# Patient Record
Sex: MALE | Race: WHITE | NOT HISPANIC OR LATINO | Employment: FULL TIME | URBAN - METROPOLITAN AREA
[De-identification: names, ages, dates, MRNs, and addresses within clinical notes are randomized per-mention and may not be internally consistent; named-entity substitution may affect disease eponyms.]

---

## 2020-08-24 PROBLEM — S62.617B OPEN DISPLACED FRACTURE OF PROXIMAL PHALANX OF LEFT LITTLE FINGER: Status: ACTIVE | Noted: 2020-08-24
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
## 2020-09-03 ENCOUNTER — CLINICAL SUPPORT (OUTPATIENT)
Dept: REHABILITATION | Facility: HOSPITAL | Age: 25
End: 2020-09-03
Payer: MEDICAID

## 2020-09-03 DIAGNOSIS — M79.645 PAIN IN FINGER OF LEFT HAND: ICD-10-CM

## 2020-09-03 DIAGNOSIS — R68.89 DECREASED FUNCTIONAL ACTIVITY TOLERANCE: ICD-10-CM

## 2020-09-03 DIAGNOSIS — S62.617B OPEN DISPLACED FRACTURE OF PROXIMAL PHALANX OF LEFT LITTLE FINGER, INITIAL ENCOUNTER: ICD-10-CM

## 2020-09-03 DIAGNOSIS — M25.642 DECREASED RANGE OF MOTION OF FINGER OF LEFT HAND: ICD-10-CM

## 2020-09-03 PROCEDURE — L3808 WHFO, RIGID W/O JOINTS: HCPCS | Mod: PN

## 2020-09-03 PROCEDURE — 97166 OT EVAL MOD COMPLEX 45 MIN: CPT | Mod: PN

## 2020-09-03 NOTE — PLAN OF CARE
" [facility] Occupational Therapy  Initial Evaluation     Date: 9/3/2020  Name: Fransisco Dominguez  MRN Number: [id number]   Medical Diagnosis: Left Small Finger open displaced fracture of proximal phalanx with ORIF with pinning  Therapy Diagnosis: Left Small Finger Pain after proximal phalanx fracture with ORIF  Physician: SAAMNTHA Dominguez MD    Physician Orders: Per Dr Shaw: "Please fabricate a thermoplastic left ulnar gutter splint",   Evaluation Date: 9/3/2020  Insurance Authorization Period Expiration: 12/31/2020  Plan of Care Certification Period to: 10/28/2020  Date of Return to MD: unknown    Visit # / Visits authorized: 1/1  # No shows/ # Cancellations:0  Time In:330pm  Time Out: 455pm  Total Billable Time: 85 minutes      CMS Impairment/Limitation/Restriction for FOTO Hand Survey    Therapist reviewed FOTO scores for Fransisco Dominguez on 9/3/2020.   FOTO documents entered into [employer] - see Media section.    Limitation Score: 47%  Goal at Discharge Score:  28%  Category: Carrying    Current : CK = at least 40% but < 60% impaired, limited or restricted  Goal: CJ = at least 20% but < 40% impaired, limited or restricted  Discharge: CJ = at least 20% but < 40% impaired, limited or restricted           Subjective     Involved Side: Left  Dominant Side: Right  Date of onset/ surgery: 8/24/2020  S/P : 1 week, 2 days post surgery on 9/3/2020  Surgical Procedure: ORIF, FINGER-SMALL FINGER-PINNING  Precautions:  fracture  Imaging: Xray 8/24/2020 FINDINGS:  Fracture of the 5th proximal phalanx with prominent apex volar angulation.  Laceration is present at the site and open fracture may be present.  No definite dislocation though there is hyperflexion at the proximal interphalangeal joint.  No radiopaque foreign body.  Impression:  Possibly open fracture of the 5th proximal phalanx with prominent apex volar angulation.    HISTORY/OCCUPATIONAL PROFILE/ Medical and Therapy History including Current " History of Injury /Mechanism of Injury:  Fransisco Dominguez is a 25 year old right hand dominant male who presents for occupational therapy today,09/02/2020 and  is1 weeks 2 days s/p Left Small Finger open displaced fracture of proximal phalanx with ORIF with pinning by Dr Shaw.  He states he was arguing with his younger brother at his apartment and the door slammed on his SF.  He went to [facility] where he was diagnosed with p1 fracture of SF and scheduled for ORIF with pinning.  He arrives today in therapy with half cast.  Orders are for a ulnar gutter orthotic, custom.    Patient's chief complaint is lack of motion and pain and reports difficulty with all ADLs  (See Functional Status Below for specific details)    Previous Management or Therapy for this Injury:  Rehabilitation Expectation/Goals: Patient's goals for therapy are to be able to   - minimize: pain  - normalize: loss of function  Pain:  Functional Pain Scale Rating 0-10:    On arrival to therapy/ at Rest:     2 out of 10  While working/ With Activity:  6 out of 10  When leaving therapy:  2 out of 10  Location of Pain:  SF mp  Description of Pain:  ache  Aggravating Factors: hitting it  Easing Factors: resting    Response to Previous Treatment: initial eval today  Functional Change: initial eval today    Previous Medical Management/ Past Medical History/Physical Systems Review:   Patient denies any previous injury to this area.    Fransisco Dominguez  has a past medical history of ADHD (attention deficit hyperactivity disorder), Anxiety, Bipolar 1 disorder, Concussion, Post traumatic stress disorder, and Substance abuse.    Fransisco Dominguez  has a past surgical history that includes Open reduction and internal fixation (ORIF) of injury of finger (Left, 8/24/2020) and Repair of laceration (Left, 8/24/2020).    Fransisco has a current medication list which includes the following prescription(s): acetaminophen, cefadroxil, and ibuprofen.    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  Working presently: no     FUNCTIONAL STATUS:   Previous functional status includes: Independent with all ADLs and ambulating without assistance   Current FunctionalStatus: Unable to use right for ANY ADLs due to ORIF   Home/Living environment :  Lives with adopted family and 2 brothers 12 and 13     Environmental Concerns/ Fall Risk:  None   Barriers to Learning:  None   Cultural/Spiritual : None   Developmental/Education: None  Nutritional Deficit: None   Abuse/Neglect : None    Language: None   Hearing/Vision Deficit: None   Other:       Objective       Observation/Appearance:  2 intact pins in dorsal mp/p1 Left small finger  Sensation Test:   Sensation grossly intact to light touch all dermatomal regions  Stereognosis:  Not tested  [place] Testing:  Not tested at this time  Sensitivity:  Patient denies numbness & tingling; Temp, touch and pressure sense are intact in fingertips per patient  Description: no signs and symptoms of infection    Range of Motion: AROM: not tested due to surgery; orders for custom orthotic    Coordination:  severely impaired for FMC in left  in ADL/IADL's   Endurance: severely impaired for light ADL/IADL's w/ use of left     Strength: (ADRIAN Dynamometer in lbs.), Average 3 trials, Position II: deferred    Treatment   An Occupational Therapy evaluation was performed today and instructions in a written HEP including edema control instructions and orthotic instructions    THERAPEUTIC ACTIVITIES x 15 mins to increase ROM, functional use, coordination, dexterity and improve fine motor control  -instructions in size E Ryan sleeve and orthotic instructions precautions  -AROM thumb, IF    Total Treatment time separate from Evaluation time: 15  Patient did require cues to don orthotic and ryan sleeve correctly.    Patient has a referral for splint fabrication from 's office  today. Patient was referred for a Ulnar Gutter Orthosis to Left hand, . Patient is noted to have mild edema in LRF and LSF of L UE. A right custom ulnar gutter orthosis,  was fabricated for support/stabilization with ryan sleeve included . Excellent fit was achieved upon completion of splint. Patient was provided instruction on orthosis purpose, wear schedule, care and precautions to monitor for orthosis. Patient was also fitted for a ryan sleeve to help with edema throughout left hand.       Home Exercise Program/Education (HEP):  NO HEP due to surgical precautions    Patient/Family Education: discussed the role of OT, goals for OT, scheduling/cancellations.  They verbalized understanding of this. Discussed insurance limitations with patient/family.    HEP update 9/3/2020 : documented new   HEP in Patient Instructions or Media and/or scanned: Yes, scanned      Assessment     Medical necessity is demonstrated by the following IMPAIRMENTS/PROBLEMS:  Patient Lack of Knowledge/Awareness in Home Program  Increased Pain in Left hand  Decreased functional use/ unable to perform ADLs/iADLs  Increased Edema  Decreased ROM   Decreased  strength  Decreased pinch strength    Fransisco Dominguez is a 25 y.o. male referred to outpatient occupational therapy and presents with a medical diagnosis of Left Small Finger open displaced fracture of proximal phalanx with ORIF with pinning, resulting in Decreased ROM, Decreased  strength, Decreased pinch strength, Decreased functional hand use, Increased pain, Edema, Joint Stiffness, Scar Adhesions and Diminished/Impaired Coordination and demonstrates limitations as described in the above problem list. He presents today with increased pain and edema and has on left with decreased ROM & decreased functioning. Splint precautions and instructions were reviewed and patient was able to independently don and doff the orthotic properly while in the clinic.  Patient will follow up in  one week for adjustments or sooner if needed.  Patient would benefit from skilled OT to follow-up on orthotic modification/adjustment, edema management and ROM.   Following medical record review it is determined that Fransisco Dominguez will benefit from skilled occupational therapy services in order to maximize pain free and/or functional use of left hand and to maximize their level of independence in the home and community environment. The following goals were discussed with the patient and patient is in agreement with them as to be addressed in the treatment plan. The patient's rehab potential is Good.  The patient and or family/caregiver was educated regarding the details of their diagnosis, prognosis, related pathology, plan of care and modality use.  The patient demonstrates a good understanding of the risks, benefits, precautions/ contraindications and prognosis of their skilled occupational therapy rehabilitation program.  We reviewed therapy expectations and goals and it was understood clearly that they will be active participants in their rehabilitation.    Fransisco demonstrated a good understanding of the education provided including HEP and modalities for pain management.     Patient prefers to attend therapy:  1 times a week with time preference afternoon/evening  Anticipated barriers to occupational therapy:  None  Fransisco has no cultural, educational or language barriers to the learning provided.    Profile and History Assessment of Occupational Performance Level of Clinical Decision Making Complexity Score   Occupational Profile:   Fransisco Dominguez is a 25 y.o. male who lives with their family and is currently unemployed. Fransisco Dominguez has difficulty with  feeding, bathing, grooming and dressing  driving/transportation management, shopping, phone/computer use and housework/household chores  affecting his/her daily functional abilities. 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     Comorbidities:   anxiety, coping style/mechanism and depression    Medical and Therapy History Review:   Expanded               Performance Deficits    Physical:  No Deficits  Joint Stability  Muscle Power/Strength  Muscle Endurance  Skin Integrity/Scar Formation  Edema   Strength  Pinch Strength  Fine Motor Coordination  Proprioception Functions  Pain    Cognitive:  Attention    Psychosocial:    Social Interaction  Habits  Routines     Clinical Decision Making:  moderate    Assessment Process:  Problem-Focused Assessments    Modification/Need for Assistance:  Minimal-Moderate Modifications/Assistance    Intervention Selection:  Several Treatment Options       moderate  Based on PMHX, co morbidities , data from assessments and functional level of assistance required with task and clinical presentation directly impacting function.       GOAL Achievement Assessed or Modified 9/3/2020: new goals below  GOALS:  1) Patient will be independent in donning and doffing splint with precautions  2) Patient will have a proper fitting orthosis/splint with correct positioning, and we will make adjustments as needed to accommodate for reduction in edema  3)  Patient will show overall improved functional ability with upper extremity by achieving a FOTO (Focus on Therapeutic Outcomes) score of less than or equal to 28%     Pt's spiritual, cultural and educational needs considered and patient is agreeable to plan of care and goals as stated above.     There are Anticipated Barriers for Occupational Therapy: transportation       Plan   Certification Period/Plan of care expiration: 9/3/2020 to 10/28/2020.    Outpatient Occupational Therapy 1 times weekly for 8 weeks to achieve the established goals.  Therapy for Fransisco may include the following interventions: Modalities for pain management, Therapeutic exercises/activities., Orthotic Fabrication/Fit/Training, Edema Control, Scar Management and Wound Care and may  include any other treatments deemed necessary based on the patient's needs or progress.               Patient may be discharged prior to POC expiration date if all goals/desired outcome met or if maximum rehabilitation potential has been achieved.     Updates Next Visit: To review HEP understanding and compliance and progress with OT treatment as tolerated.    Nikhil Cerna MS, OTR, CEAS, CHT

## 2020-09-17 ENCOUNTER — CLINICAL SUPPORT (OUTPATIENT)
Dept: REHABILITATION | Facility: HOSPITAL | Age: 25
End: 2020-09-17
Payer: MEDICAID

## 2020-09-17 DIAGNOSIS — R68.89 DECREASED FUNCTIONAL ACTIVITY TOLERANCE: ICD-10-CM

## 2020-09-17 DIAGNOSIS — M25.642 DECREASED RANGE OF MOTION OF FINGER OF LEFT HAND: ICD-10-CM

## 2020-09-17 DIAGNOSIS — M79.645 PAIN IN FINGER OF LEFT HAND: ICD-10-CM

## 2020-09-17 PROCEDURE — 97530 THERAPEUTIC ACTIVITIES: CPT | Mod: 59,PN

## 2020-09-17 NOTE — PROGRESS NOTES
"  [facility] Occupational Therapy  Daily Note     Date: 9/3/2020  Name: Fransisco Dominguez  MRN Number: [id number]   Medical Diagnosis: Left Small Finger open displaced fracture of proximal phalanx with ORIF with pinning  Therapy Diagnosis: Left Small Finger Pain after proximal phalanx fracture with ORIF  Physician: SAMANTHA Dominguez MD    Physician Orders: Per Dr Shaw: "Please fabricate a thermoplastic left ulnar gutter splint",   Evaluation Date: 9/3/2020  Insurance Authorization Period Expiration: 12/31/2020  Plan of Care Certification Period to: 10/28/2020  Date of Return to MD: unknown    Visit # / Visits authorized: 2/4  # No shows/ # Cancellations:0  Time In: 245pm  Time Out: 330pm  Total Billable Time: 45 minutes      CMS Impairment/Limitation/Restriction for FOTO Hand Survey    Therapist reviewed FOTO scores for Fransisco Dominguez on 9/3/2020.   FOTO documents entered into [employer] - see Media section.    Limitation Score: 47%  Goal at Discharge Score:  28%  Category: Carrying    Current : CK = at least 40% but < 60% impaired, limited or restricted  Goal: CJ = at least 20% but < 40% impaired, limited or restricted  Discharge: CJ = at least 20% but < 40% impaired, limited or restricted           Subjective     Involved Side: Left  Dominant Side: Right  Date of onset/ surgery: 8/24/2020  S/P :  3 week, 3 days post surgery on 9/17/2020  Surgical Procedure: ORIF, FINGER-SMALL FINGER-PINNING  Precautions:  fracture  Imaging: Xray 8/24/2020 FINDINGS:  Fracture of the 5th proximal phalanx with prominent apex volar angulation.  Laceration is present at the site and open fracture may be present.  No definite dislocation though there is hyperflexion at the proximal interphalangeal joint.  No radiopaque foreign body.  Impression:  Possibly open fracture of the 5th proximal phalanx with prominent apex volar angulation.    HISTORY/OCCUPATIONAL PROFILE/ Medical and Therapy History including Current " "History of Injury /Mechanism of Injury:  Fransisco Dominguez is a 25 year old right hand dominant male who presents for occupational therapy today,09/02/2020 and  Is s/p Left Small Finger open displaced fracture of proximal phalanx with ORIF with pinning by Dr Shaw.  He states he was arguing with his younger brother at his apartment and the door slammed on his SF.  He went to [facility] where he was diagnosed with p1 fracture of SF and scheduled for ORIF with pinning.  He arrives today in therapy with half cast.  Orders are for a ulnar gutter orthotic, custom.    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"  Pain:  Functional Pain Scale Rating 0-10:    On arrival to therapy/ at Rest:     2 out of 10  While working/ With Activity:  6 out of 10  When leaving therapy:  2 out of 10  Location of Pain:  SF mp  Description of Pain:  ache  Aggravating Factors: hitting it  Easing Factors: resting    Response to Previous Treatment: good compliant with orthotic but may be over using his hand at work  Functional Change: caitlin Dominguez  has a past medical history of ADHD (attention deficit hyperactivity disorder), Anxiety, Bipolar 1 disorder, Concussion, Post traumatic stress disorder, and Substance abuse.    Fransisco Dominguez  has a past surgical history that includes Open reduction and internal fixation (ORIF) of injury of finger (Left, 8/24/2020) and Repair of laceration (Left, 8/24/2020).    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  Working presently: no     FUNCTIONAL STATUS:   Previous functional status includes: Independent with all ADLs and ambulating without assistance   Current FunctionalStatus: Unable to use right for ANY ADLs due to ORIF   Home/Living environment :  Lives with adopted family and 2 " brothers 12 and 13     Environmental Concerns/ Fall Risk:  None   Barriers to Learning:  None   Cultural/Spiritual : None   Developmental/Education: None  Nutritional Deficit: None   Abuse/Neglect : None    Language: None   Hearing/Vision Deficit: None   Other:     Objective     Observation/Appearance:  2 intact pins in dorsal mp/p1 Left small finger  Sensation Test:   Sensation grossly intact to light touch all dermatomal regions  Sensitivity:  Patient denies numbness & tingling; Temp, touch and pressure sense are intact in fingertips per patient  Description: no signs and symptoms of infection at pin sites today 9/17/2020    Treatment     THERAPEUTIC ACTIVITIES x 45 mins to increase ROM, decrease edema through function, increase functional use, coordination, dexterity and improve fine motor control  -added 2 additional E Mariaa sleeve and orthotic instructions precautions  -AROM thumb e/f, ab, ad 2 x 10  -IF-MF e/f 3 x 10  -9 hole peg opposition to IF and MF place and remove  -padded orthotic and widened for pins and ulnar styloid     HEP update 9/17/2020 :  continue with orthotic, allowed to use thumb, IF and MF for light ADLs  HEP in Patient Instructions or Media and/or scanned: Yes, scanned    Assessment     Medical necessity is demonstrated by the following IMPAIRMENTS/PROBLEMS:  Patient Lack of Knowledge/Awareness in Home Program  Increased Pain in Left hand  Decreased functional use/ unable to perform ADLs/iADLs  Increased Edema  Decreased ROM   Decreased  strength  Decreased pinch strength    Fransisco Dominguez is a 25 y.o. male referred to outpatient occupational therapy and presents with a medical diagnosis of Left Small Finger open displaced fracture of proximal phalanx with ORIF with pinning, resulting in Decreased ROM, Decreased  strength, Decreased pinch strength, Decreased functional hand use, Increased pain, Edema, Joint Stiffness, Scar Adhesions and Diminished/Impaired Coordination and demonstrates  "limitations as described in the above problem list.  Fransisco has been compliant with custom Ulnar Gutter Orthosis to Left hand, . He thinks he may be "overdoing it at work" though because he is working as a  in the kitchen.  I urged him to adhere to precautions and we discussed bone healing and the need to keep fingers stable in the orthotic.  I also instructed him to speak with Dr Dominguez about his job duties.  He understands he should wear custom orthotic at all times in order to protect healing bone.  Edema is decreasing overall.  ROM is improving in thumb, IF and MF.   I made adjustments to orthotic for comfort. Excellent fit was achieved upon completion of orthotic. Patient was provided instruction on orthosis purpose, wear schedule, care and precautions to monitor for orthosis. Patient was also fitted with 2 extra ryan sleeves to help with edema throughout left hand. Following medical record review it is determined that Fransisco Dominguez will benefit from skilled occupational therapy services in order to maximize pain free and/or functional use of left hand and to maximize their level of independence in the home and community environment. The following goals were discussed with the patient and patient is in agreement with them as to be addressed in the treatment plan. The patient's rehab potential is Good.Fransisco demonstrated a good understanding of the education provided including HEP and modalities for pain management.     Anticipated barriers to occupational therapy:  None  Fransisco has no cultural, educational or language barriers to the learning provided.    GOAL Achievement Assessed or Modified : MET STG 1   GOALS:  1) Patient will be independent in donning and doffing splint with precautions MET 9/17/2020  2) Patient will have a proper fitting orthosis/splint with correct positioning, and we will make adjustments as needed to accommodate for reduction in edema  3)  Patient will show overall improved " functional ability with upper extremity by achieving a FOTO (Focus on Therapeutic Outcomes) score of less than or equal to 28%     Pt's spiritual, cultural and educational needs considered and patient is agreeable to plan of care and goals as stated above.     There are Anticipated Barriers for Occupational Therapy: transportation       Plan   Certification Period/Plan of care expiration: 9/3/2020 to 10/28/2020.    Outpatient Occupational Therapy 1 times weekly for 8 weeks to achieve the established goals.  Therapy for Fransisco may include the following interventions: Modalities for pain management, Therapeutic exercises/activities., Orthotic Fabrication/Fit/Training, Edema Control, Scar Management and Wound Care and may include any other treatments deemed necessary based on the patient's needs or progress.               Patient may be discharged prior to POC expiration date if all goals/desired outcome met or if maximum rehabilitation potential has been achieved.     Updates Next Visit:  Adjust orthotic as needed, assess edema and review understanding and compliance and progress with OT treatment as tolerated.    Nikhil Cerna, MS, OTR, CEAS, CHT

## 2020-10-01 ENCOUNTER — CLINICAL SUPPORT (OUTPATIENT)
Dept: REHABILITATION | Facility: HOSPITAL | Age: 25
End: 2020-10-01
Payer: MEDICAID

## 2020-10-01 DIAGNOSIS — R68.89 DECREASED FUNCTIONAL ACTIVITY TOLERANCE: ICD-10-CM

## 2020-10-01 DIAGNOSIS — M25.642 DECREASED RANGE OF MOTION OF FINGER OF LEFT HAND: ICD-10-CM

## 2020-10-01 DIAGNOSIS — M79.645 PAIN IN FINGER OF LEFT HAND: Primary | ICD-10-CM

## 2020-10-01 PROCEDURE — 97530 THERAPEUTIC ACTIVITIES: CPT | Mod: 59,PN

## 2020-10-01 NOTE — PROGRESS NOTES
"  [facility] Occupational Therapy  Daily Note     Date: 9/3/2020  Name: Fransisco Dominguez  MRN Number: [id number]   Medical Diagnosis: Left Small Finger open displaced fracture of proximal phalanx with ORIF with pinning  Therapy Diagnosis: Left Small Finger Pain after proximal phalanx fracture with ORIF  Physician: SAMANTHA Dominguez MD    Physician Orders: Per Dr Shaw: "Please fabricate a thermoplastic left ulnar gutter splint",   Evaluation Date: 9/3/2020  Insurance Authorization Period Expiration: 12/31/2020  Plan of Care Certification Period to: 10/28/2020  Date of Return to MD: unknown    Visit # / Visits authorized: 3/4  # No shows/ # Cancellations:0  Time In: 3345pm  Time Out:415pm  Total Billable Time: 30 minutes      CMS Impairment/Limitation/Restriction for FOTO Hand Survey    Therapist reviewed FOTO scores for Fransisco Dominguez on 9/3/2020.   FOTO documents entered into [facility] - see Media section.    Limitation Score: 47%  Goal at Discharge Score:  28%  Category: Carrying    Current : CK = at least 40% but < 60% impaired, limited or restricted  Goal: CJ = at least 20% but < 40% impaired, limited or restricted  Discharge: CJ = at least 20% but < 40% impaired, limited or restricted           Subjective     Involved Side: Left  Dominant Side: Right  Date of onset/ surgery: 8/24/2020  S/P :  5 week, 3 days post surgery on 10/1/2020  Surgical Procedure: ORIF, FINGER-SMALL FINGER-PINNING  Precautions:  fracture  Imaging: Xray 8/24/2020 FINDINGS:  Fracture of the 5th proximal phalanx with prominent apex volar angulation.  Laceration is present at the site and open fracture may be present.  No definite dislocation though there is hyperflexion at the proximal interphalangeal joint.  No radiopaque foreign body.  Impression:  Possibly open fracture of the 5th proximal phalanx with prominent apex volar angulation.    HISTORY/OCCUPATIONAL PROFILE/ Medical and Therapy History including Current " "History of Injury /Mechanism of Injury:  Fransisco Dominguez is a 25 year old right hand dominant male who presents for occupational therapy today,09/02/2020 and  Is s/p Left Small Finger open displaced fracture of proximal phalanx with ORIF with pinning by Dr Shaw.  He states he was arguing with his younger brother at his apartment and the door slammed on his SF.  He went to [facility] where he was diagnosed with p1 fracture of SF and scheduled for ORIF with pinning.  He arrives today in therapy with half cast.  Orders are for a ulnar gutter orthotic, custom.    Patient's chief complaint is lack of motion and pain and reports difficulty with all ADLs  (See Functional Status Below for specific details)    Daily Comments: "Dr Donahue said the pins moved and he had to realign them yesterday"  Pain:  Functional Pain Scale Rating 0-10:    On arrival to therapy/ at Rest:     2 out of 10  While working/ With Activity:  6 out of 10  When leaving therapy:  2 out of 10  Location of Pain:  SF mp  Description of Pain:  ache  Aggravating Factors: hitting it  Easing Factors: resting    Response to Previous Treatment:  States he was moving his hand too much at work and now he is only allowed to work one handed  Functional Change: none    Fransisco Dominguez  has a past medical history of ADHD (attention deficit hyperactivity disorder), Anxiety, Bipolar 1 disorder, Concussion, Post traumatic stress disorder, and Substance abuse.    Fransisco Dominguez  has a past surgical history that includes Open reduction and internal fixation (ORIF) of injury of finger (Left, 8/24/2020) and Repair of laceration (Left, 8/24/2020).    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  Working presently: no     FUNCTIONAL STATUS:   Previous functional status includes: Independent with all ADLs and ambulating without assistance   Current FunctionalStatus: Unable to use right for ANY ADLs due to ORIF   Home/Living environment :  Lives with adopted family " and 2 brothers 12 and 13     Environmental Concerns/ Fall Risk:  None   Barriers to Learning:  None   Cultural/Spiritual : None   Developmental/Education: None  Nutritional Deficit: None   Abuse/Neglect : None    Language: None   Hearing/Vision Deficit: None   Other:     Objective     Observation/Appearance:  2 intact pins in dorsal mp/p1 Left small finger  Sensation Test:   Sensation grossly intact to light touch all dermatomal regions  Sensitivity:  Patient denies numbness & tingling; Temp, touch and pressure sense are intact in fingertips per patient  Description: redness around SF pin with dried clear drainage - contacted Dr Donahue's office and left vm    Treatment     THERAPEUTIC ACTIVITIES x 45 mins to increase ROM, decrease edema through function, increase functional use, coordination, dexterity and improve fine motor control  NP-added 2 additional E Mariaa sleeve and orthotic instructions precautions  -added 2 black non compressive sleeves to his program  -AROM thumb e/f, ab, ad 2 x 10  -IF-MF e/f 3 x 10  -9 hole peg opposition to IF and MF place and remove  NP-padded orthotic and widened for pins and ulnar styloid  -cold pack with instructions  - Light neosporin with instructions     HEP update 10/1/2020 :  continue with orthotic, allowed to use thumb, IF and MF for light ADLs  HEP in Patient Instructions or Media and/or scanned: Yes, scanned    Assessment     Medical necessity is demonstrated by the following IMPAIRMENTS/PROBLEMS:  Patient Lack of Knowledge/Awareness in Home Program  Increased Pain in Left hand  Decreased functional use/ unable to perform ADLs/iADLs  Increased Edema  Decreased ROM   Decreased  strength  Decreased pinch strength    Fransisco Dominguez is a 25 y.o. male referred to outpatient occupational therapy and presents with a medical diagnosis of Left Small Finger open displaced fracture of proximal phalanx with ORIF with pinning, resulting in Decreased ROM, Decreased  strength,  Decreased pinch strength, Decreased functional hand use, Increased pain, Edema, Joint Stiffness, Scar Adhesions and Diminished/Impaired Coordination and demonstrates limitations as described in the above problem list.  Fransisco has been compliant with custom Ulnar Gutter Orthosis to Left hand, . He saw Dr Dominguez yesterday and states his pins had moved from using it too much and he realigned the pins.  He arrives in a sling now and is only allowed to work one handed.  He has some redness (pic taken on my phone) and dried clear drainage.  I contacted Dr Dominguez's office regarding this and left my number.  Fransisco understands he should wear custom orthotic at all times in order to protect healing bone.  He was issued 2 non compressive black sleeves to wear under splint. Light neosporin with instructions. Following medical record review it is determined that Fransisco Dominguez will benefit from skilled occupational therapy services in order to maximize pain free and/or functional use of left hand and to maximize their level of independence in the home and community environment. The following goals were discussed with the patient and patient is in agreement with them as to be addressed in the treatment plan. The patient's rehab potential is Good.Fransisco demonstrated a good understanding of the education provided including HEP and modalities for pain management.     Anticipated barriers to occupational therapy:  None  Fransisco has no cultural, educational or language barriers to the learning provided.    GOAL Achievement Assessed or Modified : MET STG 1   GOALS:  1) Patient will be independent in donning and doffing splint with precautions MET 9/17/2020  2) Patient will have a proper fitting orthosis/splint with correct positioning, and we will make adjustments as needed to accommodate for reduction in edema  3)  Patient will show overall improved functional ability with upper extremity by achieving a FOTO (Focus on Therapeutic Outcomes)  score of less than or equal to 28%     Pt's spiritual, cultural and educational needs considered and patient is agreeable to plan of care and goals as stated above.     There are Anticipated Barriers for Occupational Therapy: transportation       Plan   Certification Period/Plan of care expiration: 9/3/2020 to 10/28/2020.    Outpatient Occupational Therapy 1 times weekly for 8 weeks to achieve the established goals.  Therapy for Fransisco may include the following interventions: Modalities for pain management, Therapeutic exercises/activities., Orthotic Fabrication/Fit/Training, Edema Control, Scar Management and Wound Care and may include any other treatments deemed necessary based on the patient's needs or progress.               Patient may be discharged prior to POC expiration date if all goals/desired outcome met or if maximum rehabilitation potential has been achieved.     Updates Next Visit: Await Dr Dominguez's return phone call; sent Fransisco my phone number and to text or call the MD if it becomes more red, hot or painful.    Nikhil Cerna, MS, OTR, CEAS, CHT

## 2020-10-15 ENCOUNTER — CLINICAL SUPPORT (OUTPATIENT)
Dept: REHABILITATION | Facility: HOSPITAL | Age: 25
End: 2020-10-15
Payer: MEDICAID

## 2020-10-15 DIAGNOSIS — M25.642 DECREASED RANGE OF MOTION OF FINGER OF LEFT HAND: ICD-10-CM

## 2020-10-15 DIAGNOSIS — R68.89 DECREASED FUNCTIONAL ACTIVITY TOLERANCE: ICD-10-CM

## 2020-10-15 DIAGNOSIS — M79.645 PAIN IN FINGER OF LEFT HAND: Primary | ICD-10-CM

## 2020-10-15 PROCEDURE — 97530 THERAPEUTIC ACTIVITIES: CPT | Mod: PN

## 2020-10-15 NOTE — PROGRESS NOTES
"  [facility] Occupational Therapy  Daily Note     Date: 9/3/2020  Name: Fransisco Dominguez  MRN Number: [id number]   Medical Diagnosis: Left Small Finger open displaced fracture of proximal phalanx with ORIF with pinning  Therapy Diagnosis: Left Small Finger Pain after proximal phalanx fracture with ORIF  Physician: SAMANTHA Dominguez MD    Physician Orders: Per Dr Shaw: "Please fabricate a thermoplastic left ulnar gutter splint",   Evaluation Date: 9/3/2020  Insurance Authorization Period Expiration: 12/31/2020  Plan of Care Certification Period to: 10/28/2020  Date of Return to MD: unknown    Visit # / Visits authorized: 4/4  # No shows/ # Cancellations:0  Time In: 1000am  Time Out:1015am  Total Billable Time: 15 minutes      CMS Impairment/Limitation/Restriction for FOTO Hand Survey    Therapist reviewed FOTO scores for Fransisco Dominguez on 9/3/2020.   FOTO documents entered into [employer] - see Media section.    Limitation Score: 47%  Goal at Discharge Score:  28%  Category: Carrying    Current : CK = at least 40% but < 60% impaired, limited or restricted  Goal: CJ = at least 20% but < 40% impaired, limited or restricted  Discharge: CJ = at least 20% but < 40% impaired, limited or restricted           Subjective     Involved Side: Left  Dominant Side: Right  Date of onset/ surgery: 8/24/2020  S/P :  7 week, 3 days post surgery on 10/14/2020  Surgical Procedure: ORIF, FINGER-SMALL FINGER-PINNING  Precautions:  fracture  Imaging: Xray 10/12/20 FINDINGS:  Diagnostic Results:  X-ray - Hardware intact, proximal phalanx fracture stable with callous in place    HISTORY/OCCUPATIONAL PROFILE/ Medical and Therapy History including Current History of Injury /Mechanism of Injury:  Fransisco Dominguez is a 25 year old right hand dominant male who presents for occupational therapy today,09/02/2020 and  Is s/p Left Small Finger open displaced fracture of proximal phalanx with ORIF with pinning by Dr Shaw.  He states " "he was arguing with his younger brother at his apartment and the door slammed on his SF.  He went to [facility] where he was diagnosed with p1 fracture of SF and scheduled for ORIF with pinning.  He arrives today in therapy with half cast.  Orders are for a ulnar gutter orthotic, custom.    Patient's chief complaint is lack of motion and pain and reports difficulty with all ADLs  (See Functional Status Below for specific details)    Daily Comments: "I saw Dr Donahue a few days ago, he took out the pins and told me to stay in the splint for 3 more weeks"  Pain:  Functional Pain Scale Rating 0-10:    On arrival to therapy/ at Rest:     2 out of 10  While working/ With Activity:  6 out of 10  When leaving therapy:  2 out of 10  Location of Pain:  SF mp  Description of Pain:  ache  Aggravating Factors: hitting it  Easing Factors: resting    Response to Previous Treatment:  Good, decrease in edema  Functional Change: none    Fransisco Dominguez  has a past medical history of ADHD (attention deficit hyperactivity disorder), Anxiety, Bipolar 1 disorder, Concussion, Post traumatic stress disorder, and Substance abuse.    Fransisco Dominguez  has a past surgical history that includes Open reduction and internal fixation (ORIF) of injury of finger (Left, 8/24/2020) and Repair of laceration (Left, 8/24/2020).    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  Working presently: no     FUNCTIONAL STATUS:   Previous functional status includes: Independent with all ADLs and ambulating without assistance   Current FunctionalStatus: Unable to use right for ANY ADLs due to ORIF   Home/Living environment :  Lives with adopted family and 2 brothers 12 and 13     Environmental Concerns/ Fall Risk:  None   Barriers to Learning:  None   Cultural/Spiritual : None   Developmental/Education: None  Nutritional Deficit: None   Abuse/Neglect : None    Language: None   Hearing/Vision Deficit: None   Other:     Objective       Treatment "     THERAPEUTIC ACTIVITIES x 45 mins to increase ROM, decrease edema through function, increase functional use, coordination, dexterity and improve fine motor control  -added 3 additional E Mariaa sleeve and orthotic instructions precautions  -added 1 black non compressive sleeves to his program  -AROM thumb e/f, ab, ad 2 x 10  -IF-MF e/f 3 x 10  -PROM to IF, MF and thumb  -9 hole peg opposition to IF and MF place and remove  NP-padded orthotic and widened for pins and ulnar styloid  -cold pack with instructions  - Light neosporin with instructions  -instructions in self PROM to IF and MF to include dip     HEP update 10/14/2020 :  continue with orthotic, allowed to use thumb, IF and MF for light ADLs  HEP in Patient Instructions or Media and/or scanned: Yes, scanned    Assessment     Medical necessity is demonstrated by the following IMPAIRMENTS/PROBLEMS:  Patient Lack of Knowledge/Awareness in Home Program  Increased Pain in Left hand  Decreased functional use/ unable to perform ADLs/iADLs  Increased Edema  Decreased ROM   Decreased  strength  Decreased pinch strength    Fransisco Dominguez is a 25 y.o. male referred to outpatient occupational therapy and presents with a medical diagnosis of Left Small Finger open displaced fracture of proximal phalanx with ORIF with pinning, resulting in Decreased ROM, Decreased  strength, Decreased pinch strength, Decreased functional hand use, Increased pain, Edema, Joint Stiffness, Scar Adhesions and Diminished/Impaired Coordination and demonstrates limitations as described in the above problem list.  Fransisco saw Dr Doimnguez this week.  His pins were removed and he was instructed to continue with the orthotic.  We adjusted this today.  Pin sites look pink and healing well, no signs of infection.  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  AROM of thumb, IF and MF improvements after  therapy today.  I instructed him on how to perform these on his own.  He was issued 2 non compressive black sleeves to wear under splint. Light neosporin with instructions. Following medical record review it is determined that Fransisco Dominguze will benefit from skilled occupational therapy services in order to maximize pain free and/or functional use of left hand and to maximize their level of independence in the home and community environment. The following goals were discussed with the patient and patient is in agreement with them as to be addressed in the treatment plan. The patient's rehab potential is Good.Fransisco demonstrated a good understanding of the education provided including HEP and modalities for pain management.     Anticipated barriers to occupational therapy:  None  Fransisco has no cultural, educational or language barriers to the learning provided.    GOAL Achievement Assessed or Modified : MET STG 1   GOALS:  1) Patient will be independent in donning and doffing splint with precautions MET 9/17/2020  2) Patient will have a proper fitting orthosis/splint with correct positioning, and we will make adjustments as needed to accommodate for reduction in edema  3)  Patient will show overall improved functional ability with upper extremity by achieving a FOTO (Focus on Therapeutic Outcomes) score of less than or equal to 28%     Pt's spiritual, cultural and educational needs considered and patient is agreeable to plan of care and goals as stated above.     There are Anticipated Barriers for Occupational Therapy: transportation       Plan   Certification Period/Plan of care expiration: 9/3/2020 to 10/28/2020.    Outpatient Occupational Therapy 1 times weekly for 8 weeks to achieve the established goals.  Therapy for Fransisco may include the following interventions: Modalities for pain management, Therapeutic exercises/activities., Orthotic Fabrication/Fit/Training, Edema Control, Scar Management and Wound Care and may  include any other treatments deemed necessary based on the patient's needs or progress.               Patient may be discharged prior to POC expiration date if all goals/desired outcome met or if maximum rehabilitation potential has been achieved.     Updates Next Visit: Await Dr Dominguez's return phone call; sent Fransisco my phone number and to text or call the MD if it becomes more red, hot or painful.    Nikhil Cerna, MS, OTR, CEAS, CHT

## 2020-10-26 ENCOUNTER — CLINICAL SUPPORT (OUTPATIENT)
Dept: REHABILITATION | Facility: HOSPITAL | Age: 25
End: 2020-10-26
Payer: MEDICAID

## 2020-10-26 DIAGNOSIS — M79.645 PAIN IN FINGER OF LEFT HAND: ICD-10-CM

## 2020-10-26 DIAGNOSIS — M25.642 DECREASED RANGE OF MOTION OF FINGER OF LEFT HAND: ICD-10-CM

## 2020-10-26 DIAGNOSIS — R68.89 DECREASED FUNCTIONAL ACTIVITY TOLERANCE: ICD-10-CM

## 2020-10-26 PROCEDURE — 97530 THERAPEUTIC ACTIVITIES: CPT | Mod: 59,PN

## 2020-10-26 NOTE — PROGRESS NOTES
"  [facility] Occupational Therapy  Daily Note     Date: 9/3/2020  Name: Fransisco Dominguez  MRN Number: [id number]   Medical Diagnosis: Left Small Finger open displaced fracture of proximal phalanx with ORIF with pinning  Therapy Diagnosis: Left Small Finger Pain after proximal phalanx fracture with ORIF  Physician: SAMANTHA Dominguez MD    Physician Orders: Per Dr Shaw: "Please fabricate a thermoplastic left ulnar gutter splint",   Evaluation Date: 9/3/2020  Insurance Authorization Period Expiration: 12/31/2020  Plan of Care Certification Period to: 10/28/2020  Date of Return to MD: unknown    Visit # / Visits authorized: 5/12  # No shows/ # Cancellations:0  Time In: 145pm  Time Out: 230pm  Total Billable Time: 45 minutes      CMS Impairment/Limitation/Restriction for FOTO Hand Survey    Therapist reviewed FOTO scores for Fransisco Dominguez on 9/3/2020.   FOTO documents entered into [employer] - see Media section.    Limitation Score: 47%  Goal at Discharge Score:  28%  Category: Carrying    Current : CK = at least 40% but < 60% impaired, limited or restricted  Goal: CJ = at least 20% but < 40% impaired, limited or restricted  Discharge: CJ = at least 20% but < 40% impaired, limited or restricted           Subjective     Involved Side: Left  Dominant Side: Right  Date of onset/ surgery: 8/24/2020  S/P :  9 week, 1 days post surgery on 10/26/2020  Surgical Procedure: ORIF, FINGER-SMALL FINGER-PINNING  Precautions:  fracture  Imaging: Xray 10/12/20 FINDINGS:  Diagnostic Results:  X-ray - Hardware intact, proximal phalanx fracture stable with callous in place    HISTORY/OCCUPATIONAL PROFILE/ Medical and Therapy History including Current History of Injury /Mechanism of Injury:  Fransisco Dominguez is a 25 year old right hand dominant male who presents for occupational therapy today,09/02/2020 and  Is s/p Left Small Finger open displaced fracture of proximal phalanx with ORIF with pinning by Dr Shaw.  He states " "he was arguing with his younger brother at his apartment and the door slammed on his SF.  He went to [facility] where he was diagnosed with p1 fracture of SF and scheduled for ORIF with pinning.  He arrives today in therapy with half cast.  Orders are for a ulnar gutter orthotic, custom.    Patient's chief complaint is lack of motion and pain and reports difficulty with all ADLs  (See Functional Status Below for specific details)    Daily Comments: "I am part time at work; I hope they let me get rid of this splint at the doctors"  Pain:  Functional Pain Scale Rating 0-10:    On arrival to therapy/ at Rest:     0 out of 10  While working/ With Activity:  4 out of 10  When leaving therapy:  2 out of 10  Location of Pain:  SF mp  Description of Pain:  ache  Aggravating Factors: hitting it  Easing Factors: resting    Response to Previous Treatment:  Good  Functional Change: none    Fransisco Dominguez  has a past medical history of ADHD (attention deficit hyperactivity disorder), Anxiety, Bipolar 1 disorder, Concussion, Post traumatic stress disorder, and Substance abuse.    Fransisco Dominguez  has a past surgical history that includes Open reduction and internal fixation (ORIF) of injury of finger (Left, 8/24/2020) and Repair of laceration (Left, 8/24/2020).    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   Working presently: no     FUNCTIONAL STATUS:   Previous functional status includes: Independent with all ADLs and ambulating without assistance   Current FunctionalStatus: Unable to use right for ANY ADLs due to ORIF   Home/Living environment :  Lives with adopted family and 2 brothers 12 and 13     Environmental Concerns/ Fall Risk:  None   Barriers to Learning:  None   Cultural/Spiritual : None   Developmental/Education: None  Nutritional Deficit: None   Abuse/Neglect : None    Language: None   Hearing/Vision Deficit: None   Other:     Objective       Treatment     THERAPEUTIC ACTIVITIES x 45 mins to increase ROM, decrease " edema through function, increase functional use, coordination, dexterity and improve fine motor control  -added 3 additional E Ryan sleeve and orthotic instructions precautions  NP-added 1 black non compressive sleeves to his program  -AROM thumb e/f, ab, ad 2 x 10  -IF-MF e/f 3 x 10  -PROM to IF, MF and thumb  -9 hole peg opposition to IF and MF place and remove  NP-padded orthotic and widened for pins and ulnar styloid  -cold pack with instructions  - Light neosporin with instructions  -instructions in self PROM to IF and MF to include dip  -PROM wrist e/f,rd/ud  -dowel alphabet wrist drawing  -gentle AROM IF-SF     HEP update 10/14/2020 :  continue with orthotic, allowed to use thumb, IF and MF for light ADLs  HEP in Patient Instructions or Media and/or scanned: Yes, scanned    Assessment     Medical necessity is demonstrated by the following IMPAIRMENTS/PROBLEMS:  Patient Lack of Knowledge/Awareness in Home Program  Increased Pain in Left hand  Decreased functional use/ unable to perform ADLs/iADLs  Increased Edema  Decreased ROM   Decreased  strength  Decreased pinch strength    Fransisco Dominguez is a 25 y.o. male referred to outpatient occupational therapy and presents with a medical diagnosis of Left Small Finger open displaced fracture of proximal phalanx with ORIF with pinning, resulting in Decreased ROM, Decreased  strength, Decreased pinch strength, Decreased functional hand use, Increased pain, Edema, Joint Stiffness, Scar Adhesions and Diminished/Impaired Coordination and demonstrates limitations as described in the above problem list.  Fransisco has continued to wear the orthotic.  Scar is adhered.  He has not been performing scar massage.  We reviewed this again today. He is requesting additional ryan sleeves.  Following medical record review it is determined that Fransisco Dominguez will benefit from skilled occupational therapy services in order to maximize pain free and/or functional use of left hand and to  maximize their level of independence in the home and community environment. The following goals were discussed with the patient and patient is in agreement with them as to be addressed in the treatment plan. The patient's rehab potential is Good.Fransisco demonstrated a good understanding of the education provided including HEP and modalities for pain management.     Anticipated barriers to occupational therapy:  None  Fransisco has no cultural, educational or language barriers to the learning provided.    GOAL Achievement Assessed or Modified : MET STG 1,2   GOALS:  1) Patient will be independent in donning and doffing splint with precautions MET 9/17/2020  2) Patient will have a proper fitting orthosis/splint with correct positioning, and we will make adjustments as needed to accommodate for reduction in edema  3)  Patient will show overall improved functional ability with upper extremity by achieving a FOTO (Focus on Therapeutic Outcomes) score of less than or equal to 28%     Pt's spiritual, cultural and educational needs considered and patient is agreeable to plan of care and goals as stated above.     There are Anticipated Barriers for Occupational Therapy: transportation       Plan   Certification Period/Plan of care expiration: 9/3/2020 to 10/28/2020.    Outpatient Occupational Therapy 1 times weekly for 8 weeks to achieve the established goals.  Therapy for Fransisco may include the following interventions: Modalities for pain management, Therapeutic exercises/activities., Orthotic Fabrication/Fit/Training, Edema Control, Scar Management and Wound Care and may include any other treatments deemed necessary based on the patient's needs or progress.               Patient may be discharged prior to POC expiration date if all goals/desired outcome met or if maximum rehabilitation potential has been achieved.     Updates Next Visit: continue with POC    Nikhil Cerna, MS, OTR, CEAS, CHT

## 2020-11-07 ENCOUNTER — DOCUMENTATION ONLY (OUTPATIENT)
Dept: REHABILITATION | Facility: HOSPITAL | Age: 25
End: 2020-11-07

## 2020-11-07 NOTE — PROGRESS NOTES
Fransisco no showed and no called for his appointment today.  I tried to contact him on his cell phone but was unable to leave a message.

## 2020-11-17 ENCOUNTER — CLINICAL SUPPORT (OUTPATIENT)
Dept: REHABILITATION | Facility: HOSPITAL | Age: 25
End: 2020-11-17
Payer: MEDICAID

## 2020-11-17 DIAGNOSIS — M79.645 PAIN IN FINGER OF LEFT HAND: Primary | ICD-10-CM

## 2020-11-17 DIAGNOSIS — S62.617B OPEN DISPLACED FRACTURE OF PROXIMAL PHALANX OF LEFT LITTLE FINGER, INITIAL ENCOUNTER: ICD-10-CM

## 2020-11-17 DIAGNOSIS — R68.89 DECREASED FUNCTIONAL ACTIVITY TOLERANCE: ICD-10-CM

## 2020-11-17 DIAGNOSIS — M25.642 DECREASED RANGE OF MOTION OF FINGER OF LEFT HAND: ICD-10-CM

## 2020-11-17 PROCEDURE — 97530 THERAPEUTIC ACTIVITIES: CPT | Mod: 59,PN

## 2020-11-17 NOTE — PROGRESS NOTES
[facility] Occupational Therapy  Daily Note     Date: 9/3/2020  Name: Fransisco Dominguez  MRN Number: [id number]   Medical Diagnosis: Left Small Finger open displaced fracture of proximal phalanx with ORIF with pinning  Therapy Diagnosis: Left Small Finger Pain after proximal phalanx fracture with ORIF  Physician: SAMANTHA Dominguez MD    Physician Orders:   Plan:   1.Continue wearing splint  2. Stressed importance of immobilization and smoking cessation in order to allow for fracture healing  Evaluation Date: 9/3/2020  Insurance Authorization Period Expiration: 12/31/2020  Plan of Care Certification Period to: 10/28/2020  Date of Return to MD:11/23/2020      Visit # / Visits authorized: 5/12  # No shows/ # Cancellations:0  Time In: 1000  Time Out: 1045  Total Billable Time: 45 minutes      CMS Impairment/Limitation/Restriction for FOTO Hand Survey    Therapist reviewed FOTO scores for Fransisco Dominguez on 9/3/2020.   FOTO documents entered into [employer] - see Media section.    Limitation Score: 47%  Goal at Discharge Score:  28%  Category: Carrying    Current : CK = at least 40% but < 60% impaired, limited or restricted  Goal: CJ = at least 20% but < 40% impaired, limited or restricted  Discharge: CJ = at least 20% but < 40% impaired, limited or restricted           Subjective     Involved Side: Left  Dominant Side: Right  Date of onset/ surgery: 8/24/2020  S/P :  12 week, 2 days post surgery on 11/17/2020  Surgical Procedure: ORIF, FINGER-SMALL FINGER-PINNING  Precautions:  fracture  Imaging: Xray 11/2/2020 Diagnostic Results:  X-ray - Left small finger proximal phalanx fracture with mild dorsal angulation since pin removal, minimal callous in place      HISTORY/OCCUPATIONAL PROFILE/ Medical and Therapy History including Current History of Injury /Mechanism of Injury:  Fransisco Dominguez is a 25 year old right hand dominant male who presents for occupational therapy today,09/02/2020 and  Is s/p Left Small Finger  "open displaced fracture of proximal phalanx with ORIF with pinning by Dr Shaw.  He states he was arguing with his younger brother at his apartment and the door slammed on his SF.  He went to [facility] where he was diagnosed with p1 fracture of SF and scheduled for ORIF with pinning.  He arrives today in therapy with half cast.  Orders are for a ulnar gutter orthotic, custom.    Patient's chief complaint is lack of motion and pain and reports difficulty with all ADLs  (See Functional Status Below for specific details)    Daily Comments: "I see the doctor on Monday to see if he wants to do surgery"  Pain:  Functional Pain Scale Rating 0-10:    On arrival to therapy/ at Rest:     0 out of 10  While working/ With Activity:  4 out of 10  When leaving therapy:  2 out of 10  Location of Pain:  SF mp  Description of Pain:  ache  Aggravating Factors: hitting it  Easing Factors: resting    Response to Previous Treatment:  Good  Functional Change: none    Fransisco Dominguez  has a past medical history of ADHD (attention deficit hyperactivity disorder), Anxiety, Bipolar 1 disorder, Concussion, Post traumatic stress disorder, and Substance abuse.    Fransisco Dominguez  has a past surgical history that includes Open reduction and internal fixation (ORIF) of injury of finger (Left, 8/24/2020) and Repair of laceration (Left, 8/24/2020).    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   Working presently: no     FUNCTIONAL STATUS:   Previous functional status includes: Independent with all ADLs and ambulating without assistance   Current FunctionalStatus: Unable to use right for ANY ADLs due to ORIF   Home/Living environment :  Lives with adopted family and 2 brothers 12 and 13     Environmental Concerns/ Fall Risk:  None   Barriers to Learning:  None   Cultural/Spiritual : None   Developmental/Education: None  Nutritional Deficit: None   Abuse/Neglect : None    Language: None   Hearing/Vision Deficit: None   Other:     Objective " "      Treatment     THERAPEUTIC ACTIVITIES x 45 mins to increase ROM, decrease edema through function, increase functional use, coordination, dexterity and improve fine motor control  -added 3 additional E Mariaa sleeve and orthotic instructions precautions  NP-added 1 black non compressive sleeves to his program  -AROM thumb e/f, ab, ad 2 x 10  -IF-MF e/f 3 x 10  -PROM to IF, MF and thumb  -9 hole peg opposition to IF and MF place and remove  NP-padded orthotic and widened for pins and ulnar styloid  -cold pack with instructions  - Light neosporin with instructions  -instructions in self PROM to IF and MF to include dip  -PROM wrist e/f,rd/ud  -dowel alphabet wrist drawing  -gentle AROM IF-SF     HEP update 11/172020 :  continue with orthotic, allowed to use thumb, IF and MF for light ADLs  HEP in Patient Instructions or Media and/or scanned: Yes, scanned    Assessment     Medical necessity is demonstrated by the following IMPAIRMENTS/PROBLEMS:  Patient Lack of Knowledge/Awareness in Home Program  Increased Pain in Left hand  Decreased functional use/ unable to perform ADLs/iADLs  Increased Edema  Decreased ROM   Decreased  strength  Decreased pinch strength    Fransisco Dominguez is a 25 y.o. male referred to outpatient occupational therapy and presents with a medical diagnosis of Left Small Finger open displaced fracture of proximal phalanx with ORIF with pinning, resulting in Decreased ROM, Decreased  strength, Decreased pinch strength, Decreased functional hand use, Increased pain, Edema, Joint Stiffness, Scar Adhesions and Diminished/Impaired Coordination and demonstrates limitations as described in the above problem list.  Fransisco has not attended therapy for the past month.  He states he has had "a lot going on".  He states has continued to wear the orthotic and is only able to work part time.  Scar is moderately adhered.  He has not been performing scar massage.  We reviewed this again today. RF moderate to " severe joint tightness.  Following medical record review it is determined that Fransisco Angelica will benefit from skilled occupational therapy services in order to maximize pain free and/or functional use of left hand and to maximize their level of independence in the home and community environment. The following goals were discussed with the patient and patient is in agreement with them as to be addressed in the treatment plan. The patient's rehab potential is Good.Fransisco demonstrated a good understanding of the education provided including HEP and modalities for pain management.     Anticipated barriers to occupational therapy:  None  Fransisco has no cultural, educational or language barriers to the learning provided.    GOAL Achievement Assessed or Modified : MET STG 1,2   GOALS:  1) Patient will be independent in donning and doffing splint with precautions MET 9/17/2020  2) Patient will have a proper fitting orthosis/splint with correct positioning, and we will make adjustments as needed to accommodate for reduction in edema  3)  Patient will show overall improved functional ability with upper extremity by achieving a FOTO (Focus on Therapeutic Outcomes) score of less than or equal to 28%     Pt's spiritual, cultural and educational needs considered and patient is agreeable to plan of care and goals as stated above.     There are Anticipated Barriers for Occupational Therapy: transportation       Plan   Certification Period/Plan of care expiration: 9/3/2020 to 10/28/2020.    Outpatient Occupational Therapy 1 times weekly for 8 weeks to achieve the established goals.  Therapy for Fransisco may include the following interventions: Modalities for pain management, Therapeutic exercises/activities., Orthotic Fabrication/Fit/Training, Edema Control, Scar Management and Wound Care and may include any other treatments deemed necessary based on the patient's needs or progress.               Patient may be discharged prior to POC  expiration date if all goals/desired outcome met or if maximum rehabilitation potential has been achieved.     Updates Next Visit: continue with POC    Nikhil Cerna MS, OTR, CEAS, CHT

## 2020-11-19 ENCOUNTER — CLINICAL SUPPORT (OUTPATIENT)
Dept: REHABILITATION | Facility: HOSPITAL | Age: 25
End: 2020-11-19
Payer: MEDICAID

## 2020-11-19 DIAGNOSIS — M25.642 DECREASED RANGE OF MOTION OF FINGER OF LEFT HAND: ICD-10-CM

## 2020-11-19 DIAGNOSIS — M79.645 PAIN IN FINGER OF LEFT HAND: Primary | ICD-10-CM

## 2020-11-19 DIAGNOSIS — R68.89 DECREASED FUNCTIONAL ACTIVITY TOLERANCE: ICD-10-CM

## 2020-11-19 PROCEDURE — 97530 THERAPEUTIC ACTIVITIES: CPT | Mod: 59,PN
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
## 2020-11-30 ENCOUNTER — DOCUMENTATION ONLY (OUTPATIENT)
Dept: REHABILITATION | Facility: HOSPITAL | Age: 25
End: 2020-11-30

## 2023-06-08 PROBLEM — I60.9 SAH (SUBARACHNOID HEMORRHAGE): Status: ACTIVE | Noted: 2023-06-08

## 2023-06-08 PROBLEM — E87.20 LACTIC ACID ACIDOSIS: Status: ACTIVE | Noted: 2023-06-08

## 2023-06-08 PROBLEM — G93.40 ACUTE ENCEPHALOPATHY: Status: ACTIVE | Noted: 2023-06-08

## 2023-06-08 PROBLEM — G40.901 STATUS EPILEPTICUS: Status: ACTIVE | Noted: 2023-06-08

## 2023-06-08 PROBLEM — D72.829 LEUKOCYTOSIS: Status: ACTIVE | Noted: 2023-06-08

## 2023-06-08 PROBLEM — F12.10 MARIJUANA ABUSE: Status: ACTIVE | Noted: 2023-06-08

## 2023-06-08 PROBLEM — E88.09 HYPERALBUMINEMIA: Status: ACTIVE | Noted: 2023-06-08

## 2023-06-08 PROBLEM — M62.82 NON-TRAUMATIC RHABDOMYOLYSIS: Status: ACTIVE | Noted: 2023-06-08

## 2023-06-08 PROBLEM — R74.01 TRANSAMINITIS: Status: ACTIVE | Noted: 2023-06-08

## 2023-06-08 PROBLEM — R73.9 HYPERGLYCEMIA: Status: ACTIVE | Noted: 2023-06-08

## 2023-06-08 PROBLEM — F32.A ANXIETY AND DEPRESSION: Status: ACTIVE | Noted: 2023-06-08

## 2023-06-08 PROBLEM — L81.8 TATTOO OF SKIN: Status: ACTIVE | Noted: 2023-06-08

## 2023-06-08 PROBLEM — E83.41 HYPERMAGNESEMIA: Status: ACTIVE | Noted: 2023-06-08

## 2023-06-08 PROBLEM — F15.10 AMPHETAMINE ABUSE: Status: ACTIVE | Noted: 2023-06-08

## 2023-06-08 PROBLEM — F17.210 TOBACCO DEPENDENCE DUE TO CIGARETTES: Status: ACTIVE | Noted: 2023-06-08

## 2023-06-08 PROBLEM — F41.9 ANXIETY AND DEPRESSION: Status: ACTIVE | Noted: 2023-06-08

## 2024-01-04 ENCOUNTER — OFFICE VISIT (OUTPATIENT)
Dept: URGENT CARE | Facility: CLINIC | Age: 29
End: 2024-01-04
Payer: MEDICAID

## 2024-01-04 VITALS
OXYGEN SATURATION: 97 % | WEIGHT: 159 LBS | SYSTOLIC BLOOD PRESSURE: 126 MMHG | TEMPERATURE: 99 F | HEIGHT: 73 IN | RESPIRATION RATE: 16 BRPM | HEART RATE: 60 BPM | BODY MASS INDEX: 21.07 KG/M2 | DIASTOLIC BLOOD PRESSURE: 70 MMHG

## 2024-01-04 DIAGNOSIS — J00 NASOPHARYNGITIS: Primary | ICD-10-CM

## 2024-01-04 DIAGNOSIS — R05.9 COUGH, UNSPECIFIED TYPE: ICD-10-CM

## 2024-01-04 LAB
CTP QC/QA: YES
CTP QC/QA: YES
POC MOLECULAR INFLUENZA A AGN: NEGATIVE
POC MOLECULAR INFLUENZA B AGN: NEGATIVE
SARS-COV-2 AG RESP QL IA.RAPID: NEGATIVE

## 2024-01-04 PROCEDURE — 99203 OFFICE O/P NEW LOW 30 MIN: CPT | Mod: S$GLB,,, | Performed by: PHYSICIAN ASSISTANT

## 2024-01-04 PROCEDURE — 87502 INFLUENZA DNA AMP PROBE: CPT | Mod: QW,S$GLB,, | Performed by: PHYSICIAN ASSISTANT

## 2024-01-04 PROCEDURE — 87811 SARS-COV-2 COVID19 W/OPTIC: CPT | Mod: QW,S$GLB,, | Performed by: PHYSICIAN ASSISTANT

## 2024-01-04 RX ORDER — FLUOXETINE HYDROCHLORIDE 40 MG/1
40 CAPSULE ORAL DAILY
COMMUNITY

## 2024-01-04 RX ORDER — BUSPIRONE HYDROCHLORIDE 5 MG/1
5 TABLET ORAL 2 TIMES DAILY
COMMUNITY

## 2024-01-04 RX ORDER — IPRATROPIUM BROMIDE 21 UG/1
2 SPRAY, METERED NASAL 3 TIMES DAILY
Qty: 30 ML | Refills: 0 | Status: SHIPPED | OUTPATIENT
Start: 2024-01-04

## 2024-01-04 RX ORDER — PROMETHAZINE HYDROCHLORIDE AND DEXTROMETHORPHAN HYDROBROMIDE 6.25; 15 MG/5ML; MG/5ML
5 SYRUP ORAL EVERY 4 HOURS PRN
Qty: 240 ML | Refills: 0 | Status: SHIPPED | OUTPATIENT
Start: 2024-01-04 | End: 2024-01-14

## 2024-01-04 RX ORDER — HYDROXYZINE HYDROCHLORIDE 10 MG/1
25 TABLET, FILM COATED ORAL 3 TIMES DAILY PRN
COMMUNITY

## 2024-01-04 NOTE — PATIENT INSTRUCTIONS

## 2024-01-04 NOTE — PROGRESS NOTES
"Subjective:      Patient ID: Fransisco Dominguez is a 28 y.o. male.    Vitals:  height is 6' 1" (1.854 m) and weight is 72.1 kg (159 lb). His oral temperature is 98.5 °F (36.9 °C). His blood pressure is 126/70 and his pulse is 60. His respiration is 16 and oxygen saturation is 97%.     Chief Complaint: Cough    Pt presents to urgent care with cough yesterday, out of his element.  This morning he woke up with more of a cough, hot.  Symptoms started yesterday.  He requests covid and flu testing at todays visit. He has a 3 month old at home.     Cough  This is a new problem. The current episode started yesterday. The problem has been unchanged. The problem occurs constantly. The cough is Non-productive. Associated symptoms include postnasal drip. Pertinent negatives include no chest pain, chills, ear pain, eye redness, fever, headaches, heartburn, hemoptysis, myalgias, rash, sore throat, shortness of breath or wheezing. Nothing aggravates the symptoms. He has tried nothing for the symptoms. The treatment provided no relief.       Constitution: Positive for fatigue. Negative for chills, sweating and fever.   HENT:  Positive for congestion, postnasal drip, sinus pain and sinus pressure. Negative for ear pain, drooling, sore throat, trouble swallowing and voice change.    Neck: Negative for neck pain, neck stiffness and painful lymph nodes.   Cardiovascular:  Negative for chest pain, leg swelling, palpitations, sob on exertion and passing out.   Eyes:  Negative for eye discharge, eye itching, eye pain, eye redness and eyelid swelling.   Respiratory:  Positive for cough. Negative for chest tightness, sputum production, bloody sputum, shortness of breath, stridor and wheezing.    Gastrointestinal:  Negative for abdominal pain, abdominal bloating, nausea, vomiting, constipation, diarrhea and heartburn.   Genitourinary:  Negative for urine decreased.   Musculoskeletal:  Negative for joint pain, joint swelling, abnormal ROM of joint, " pain with walking, muscle cramps and muscle ache.   Skin:  Negative for rash and hives.   Allergic/Immunologic: Negative for hives, itching and sneezing.   Neurological:  Negative for dizziness, light-headedness, passing out, loss of balance, headaches, altered mental status, loss of consciousness, numbness and seizures.   Hematologic/Lymphatic: Negative for swollen lymph nodes.   Psychiatric/Behavioral:  Negative for altered mental status and nervous/anxious. The patient is not nervous/anxious.       Objective:     Physical Exam   Constitutional: He is oriented to person, place, and time. He appears well-developed. He is cooperative.  Non-toxic appearance. He does not appear ill. No distress.   HENT:   Head: Normocephalic and atraumatic.   Ears:   Right Ear: Hearing, tympanic membrane, external ear and ear canal normal.   Left Ear: Hearing, tympanic membrane, external ear and ear canal normal.   Nose: Mucosal edema and rhinorrhea present. No nasal deformity. No epistaxis. Right sinus exhibits no maxillary sinus tenderness and no frontal sinus tenderness. Left sinus exhibits no maxillary sinus tenderness and no frontal sinus tenderness.   Mouth/Throat: Uvula is midline and mucous membranes are normal. No trismus in the jaw. Normal dentition. No uvula swelling. Posterior oropharyngeal erythema and cobblestoning present. No oropharyngeal exudate, posterior oropharyngeal edema or tonsillar abscesses. No tonsillar exudate.   Eyes: Conjunctivae and lids are normal. No scleral icterus.   Neck: Trachea normal and phonation normal. Neck supple. No edema present. No erythema present. No neck rigidity present.   Cardiovascular: Normal rate, regular rhythm, normal heart sounds and normal pulses.   Pulmonary/Chest: Effort normal and breath sounds normal. No accessory muscle usage or stridor. No respiratory distress. He has no decreased breath sounds. He has no wheezes. He has no rhonchi. He has no rales.   Abdominal: Normal  appearance.   Musculoskeletal: Normal range of motion.         General: No deformity. Normal range of motion.   Lymphadenopathy:     He has no cervical adenopathy.   Neurological: He is alert and oriented to person, place, and time. He has normal sensation. He exhibits normal muscle tone. Gait normal. Coordination normal.   Skin: Skin is warm, dry, intact, not diaphoretic, not pale and no rash. Capillary refill takes less than 2 seconds.   Psychiatric: His speech is normal and behavior is normal. Judgment and thought content normal.   Nursing note and vitals reviewed.    Results for orders placed or performed in visit on 01/04/24   SARS Coronavirus 2 Antigen, POCT Manual Read   Result Value Ref Range    SARS Coronavirus 2 Antigen Negative Negative     Acceptable Yes    POCT Influenza A/B MOLECULAR   Result Value Ref Range    POC Molecular Influenza A Ag Negative Negative, Not Reported    POC Molecular Influenza B Ag Negative Negative, Not Reported     Acceptable Yes       Assessment:     1. Nasopharyngitis    2. Cough, unspecified type        Plan:       Nasopharyngitis    Cough, unspecified type  -     SARS Coronavirus 2 Antigen, POCT Manual Read  -     POCT Influenza A/B MOLECULAR    Other orders  -     promethazine-dextromethorphan (PROMETHAZINE-DM) 6.25-15 mg/5 mL Syrp; Take 5 mLs by mouth every 4 (four) hours as needed (cough).  Dispense: 240 mL; Refill: 0  -     ipratropium (ATROVENT) 21 mcg (0.03 %) nasal spray; 2 sprays by Each Nostril route 3 (three) times daily.  Dispense: 30 mL; Refill: 0      Patient Instructions   INSTRUCTIONS:  - Rest.  - Drink plenty of fluids.  - Take Tylenol and/or Ibuprofen as directed as needed for fever/pain.  Do not take more than the recommended dose.  - follow up with your PCP within the next 1-2 weeks as needed.  - You must understand that you have received an Urgent Care treatment only and that you may be released before all of your medical  problems are known or treated.   - You, the patient, will arrange for follow up care as instructed.   - If your condition worsens or fails to improve we recommend that you receive another evaluation at the ER immediately or contact your PCP to discuss your concerns.   - You can call [phone] or [phone] to help schedule an appointment with the appropriate provider.     -If you smoke cigarettes, it would be beneficial for you to stop.